# Patient Record
Sex: FEMALE | Race: WHITE | NOT HISPANIC OR LATINO
[De-identification: names, ages, dates, MRNs, and addresses within clinical notes are randomized per-mention and may not be internally consistent; named-entity substitution may affect disease eponyms.]

---

## 2017-01-11 ENCOUNTER — RX ONLY (OUTPATIENT)
Age: 62
Setting detail: RX ONLY
End: 2017-01-11

## 2017-01-11 RX ORDER — TRETINOIN 1 MG/G
CREAM TOPICAL
Qty: 1 | Refills: 0 | Status: ERX

## 2021-12-01 ENCOUNTER — APPOINTMENT (RX ONLY)
Dept: URBAN - METROPOLITAN AREA CLINIC 43 | Facility: CLINIC | Age: 66
Setting detail: DERMATOLOGY
End: 2021-12-01

## 2021-12-01 DIAGNOSIS — L98.8 OTHER SPECIFIED DISORDERS OF THE SKIN AND SUBCUTANEOUS TISSUE: ICD-10-CM

## 2021-12-01 DIAGNOSIS — Z41.9 ENCOUNTER FOR PROCEDURE FOR PURPOSES OTHER THAN REMEDYING HEALTH STATE, UNSPECIFIED: ICD-10-CM

## 2021-12-01 PROCEDURE — ? FILLERS

## 2021-12-01 PROCEDURE — ? PRESCRIPTION

## 2021-12-01 PROCEDURE — ? JUVEDERM VOLBELLA INJECTION

## 2021-12-01 RX ORDER — TRETIONIN 1 MG/G
CREAM TOPICAL
Qty: 20 | Refills: 6 | Status: ERX | COMMUNITY
Start: 2021-12-01

## 2021-12-01 RX ADMIN — TRETIONIN: 1 CREAM TOPICAL at 00:00

## 2021-12-01 ASSESSMENT — LOCATION SIMPLE DESCRIPTION DERM
LOCATION SIMPLE: RIGHT LIP
LOCATION SIMPLE: LEFT FOREHEAD

## 2021-12-01 ASSESSMENT — LOCATION DETAILED DESCRIPTION DERM
LOCATION DETAILED: RIGHT SUPERIOR VERMILION LIP
LOCATION DETAILED: LEFT INFERIOR MEDIAL FOREHEAD

## 2021-12-01 ASSESSMENT — LOCATION ZONE DERM
LOCATION ZONE: FACE
LOCATION ZONE: LIP

## 2021-12-01 NOTE — PROCEDURE: JUVEDERM VOLBELLA INJECTION
Vermilion Lips Filler Volume In Cc: 0
Anesthesia Type: 1% lidocaine with epinephrine
Consent: Written consent obtained. Risks include but not limited to bruising, beading, irregular texture, ulceration, infection, allergic reaction, scar formation, incomplete augmentation, temporary nature, procedural pain.
Additional Anesthesia Volume In Cc: 6
Lot #: [id number]
Procedural Text: The filler was administered to the treatment areas noted above.
Use Map Statement For Sites (Optional): No
Topical Anesthesia?: BLT cream (benzocaine 20%, lidocaine 6%, tetracaine 4%)
Number Of Syringes (Required For Inventory): 1
Post-Care Instructions: Patient instructed to apply ice to reduce swelling.
Map Statment: See Attach Map for Complete Details
Anesthesia Volume In Cc: 0.5
Detail Level: Detailed
Expiration Date (Month Year): 5/2023
Filler: Juvederm Volbella XC

## 2021-12-01 NOTE — HPI: COSMETIC (FILLERS)
Have You Had Fillers Before?: has had fillers
Additional History: Patient comes in for consult of filler for definition top lip and volume of lower lip. Possibly cheeks

## 2021-12-01 NOTE — PROCEDURE: FILLERS
Vermilion Lips Filler Volume In Cc: 0
Consent: Written consent obtained. Risks include but not limited to bruising, beading, irregular texture, ulceration, infection, allergic reaction, scar formation, incomplete augmentation, temporary nature, procedural pain.
Include Cannula Information In Note?: No
Filler: Juvederm Volbella XC
Post-Care Instructions: Patient instructed to apply ice to reduce swelling.
Detail Level: Detailed
Vermilion Lips Filler Volume In Cc: 1
Lot #: [id number]
Anesthesia Type: 1% lidocaine with epinephrine
Expiration Date (Month Year): 05/2023
Anesthesia Volume In Cc: 0.5
Map Statment: See Attach Map for Complete Details
Additional Anesthesia Volume In Cc: 6
Additional Area 1 Location: upper cutaneous lip

## 2022-05-05 ENCOUNTER — APPOINTMENT (RX ONLY)
Dept: URBAN - METROPOLITAN AREA CLINIC 43 | Facility: CLINIC | Age: 67
Setting detail: DERMATOLOGY
End: 2022-05-05

## 2022-05-05 DIAGNOSIS — L98.8 OTHER SPECIFIED DISORDERS OF THE SKIN AND SUBCUTANEOUS TISSUE: ICD-10-CM

## 2022-05-05 PROCEDURE — ? BOTOX

## 2022-05-05 PROCEDURE — ? FILLERS

## 2022-05-05 ASSESSMENT — LOCATION SIMPLE DESCRIPTION DERM
LOCATION SIMPLE: LEFT LIP
LOCATION SIMPLE: INFERIOR FOREHEAD

## 2022-05-05 ASSESSMENT — LOCATION DETAILED DESCRIPTION DERM
LOCATION DETAILED: LEFT SUPERIOR VERMILION LIP
LOCATION DETAILED: INFERIOR MID FOREHEAD

## 2022-05-05 ASSESSMENT — LOCATION ZONE DERM
LOCATION ZONE: FACE
LOCATION ZONE: LIP

## 2022-05-05 NOTE — PROCEDURE: BOTOX
Show Nasal Units: Yes
Dilution (U/0.1 Cc): 4
Left Periorbital Skin Units: 0
Show Lcl Units: No
Expiration Date (Month Year): 4/24
Glabellar Complex Units: 20
Consent: Written consent obtained. Risks include but not limited to lid/brow ptosis, bruising, swelling, diplopia, temporary effect, incomplete chemical denervation.
Post-Care Instructions: Patient instructed to not lie down for 4 hours and limit physical activity for 24 hours.
Detail Level: Detailed
Lot #: [id number]
Periorbital Skin Units: 24

## 2022-05-05 NOTE — PROCEDURE: FILLERS
Additional Area 2 Volume In Cc: 0
Use Map Statement For Sites (Optional): No
Additional Anesthesia Volume In Cc: 6
Filler: Juvederm Ultra XC
Post-Care Instructions: Patient instructed to apply ice to reduce swelling.
Map Statment: See Attach Map for Complete Details
Vermilion Lips Filler Volume In Cc: 1
Lot #: [id number]
Expiration Date (Month Year): 08/22
Detail Level: Detailed
Anesthesia Type: 1% lidocaine with epinephrine
Anesthesia Volume In Cc: 0.5
Consent: Written consent obtained. Risks include but not limited to bruising, beading, irregular texture, ulceration, infection, allergic reaction, scar formation, incomplete augmentation, temporary nature, procedural pain.

## 2022-05-05 NOTE — HPI: COSMETIC (BOTOX)
Have You Had Botox Before?: has had botox
Additional History: Comes in today for Botox and volbella lip filler.

## 2024-04-15 ENCOUNTER — APPOINTMENT (RX ONLY)
Dept: URBAN - METROPOLITAN AREA CLINIC 43 | Facility: CLINIC | Age: 69
Setting detail: DERMATOLOGY
End: 2024-04-15

## 2024-04-15 ENCOUNTER — RX ONLY (OUTPATIENT)
Age: 69
Setting detail: RX ONLY
End: 2024-04-15

## 2024-04-15 DIAGNOSIS — L98.8 OTHER SPECIFIED DISORDERS OF THE SKIN AND SUBCUTANEOUS TISSUE: ICD-10-CM

## 2024-04-15 PROCEDURE — ? PRESCRIPTION

## 2024-04-15 PROCEDURE — ? FILLERS

## 2024-04-15 PROCEDURE — ? SKIN MEDICINALS

## 2024-04-15 RX ORDER — BIMATOPROST 0.3 MG/ML
SOLUTION/ DROPS OPHTHALMIC
Qty: 3 | Refills: 0 | Status: ERX | COMMUNITY
Start: 2024-04-15

## 2024-04-15 RX ORDER — BIMATOPROST 0.3 MG/ML
SOLUTION/ DROPS OPHTHALMIC
Qty: 3 | Refills: 2 | Status: ERX | COMMUNITY
Start: 2024-04-15

## 2024-04-15 RX ADMIN — BIMATOPROST: 0.3 SOLUTION/ DROPS OPHTHALMIC at 00:00

## 2024-04-15 ASSESSMENT — LOCATION SIMPLE DESCRIPTION DERM
LOCATION SIMPLE: RIGHT FOREHEAD
LOCATION SIMPLE: LEFT SUPERIOR EYELID
LOCATION SIMPLE: LEFT LIP

## 2024-04-15 ASSESSMENT — LOCATION ZONE DERM
LOCATION ZONE: EYELID
LOCATION ZONE: FACE
LOCATION ZONE: LIP

## 2024-04-15 ASSESSMENT — LOCATION DETAILED DESCRIPTION DERM
LOCATION DETAILED: LEFT INFERIOR VERMILION LIP
LOCATION DETAILED: RIGHT FOREHEAD
LOCATION DETAILED: LEFT LATERAL SUPERIOR EYELID

## 2024-04-15 NOTE — PROCEDURE: [facility]
Sig: Apply a thin layer to the affected areas twice daily
Sig: Apply pea sized amount per area at night
Sig: Use as directed when washing hair
Sig: Apply to affected areas twice daily
Sig: Apply a thin layer to the affected nails daily
Sig: Apply to affected areas on face twice daily
Sig: Apply a thin layer to the itching areas twice daily as needed
Sig: Apply a thin layer to the areas with decreased hair density 1-2 times daily
Sig: Take one twice daily
Sig: Take one pill twice daily
Sig: Apply a thin layer to the affected skin twice daily
Detail Level: Detailed
Sig: Apply a thin layer to the affected areas daily
Sig: Apply nightly to warts nightly under occlusion
Sig: Wash affected areas daily.
Sig: Take one pill daily
Sig: Apply a thin layer to the scar daily
Product Type (1): Acne
Sig: Apply to the affected skin twice daily
Sig: Apply thin layer twice daily for 5-7 days to face for actinic keratosis
Acne Medicines Prescription 1: Tretinoin 0.1%, Niacinamide 2%, Hyaluronic Acid 0.25% Cream
Sig: Apply twice daily for 5 days
Intro Statement: I recommended the following products:

## 2024-04-15 NOTE — PROCEDURE: FILLERS
Dorsal Hands Filler Volume In Cc: 0
Include Cannula Information In Note?: No
Map Statment: See Attach Map for Complete Details
Lot #: [id number]
Expiration Date (Month Year): 07/24
Filler: Juvederm Vollure XC
Vermilion Lips Filler Volume In Cc: 1
Lot #: [id number]
Expiration Date (Month Year): 1/1/25
Anesthesia Type: 1% lidocaine with epinephrine
Aspiration Statement: Aspiration was performed prior to injecting site with filler.
Anesthesia Volume In Cc: 0.5
Additional Area 1 Location: upper lip lines
Lot #: [id number]
Additional Anesthesia Volume In Cc: 6
Additional Area 1 Location: upper cutaneous lip
Detail Level: Detailed
Expiration Date (Month Year): 11/16/24
Additional Area 2 Location: lower cutaneous lip
Consent: Written consent obtained. Risks include but not limited to bruising, beading, irregular texture, ulceration, infection, allergic reaction, scar formation, incomplete augmentation, temporary nature, procedural pain.
Post-Care Instructions: Patient instructed to apply ice to reduce swelling.
Filler: Juvederm Volbella XC

## 2024-04-30 RX ORDER — TRETIONIN 1 MG/G
CREAM TOPICAL
Qty: 20 | Refills: 2 | Status: ERX

## 2024-04-30 RX ORDER — TRETIONIN 1 MG/G
CREAM TOPICAL
Qty: 20 | Refills: 2 | Status: CANCELLED

## 2024-05-01 ENCOUNTER — RX ONLY (OUTPATIENT)
Age: 69
Setting detail: RX ONLY
End: 2024-05-01

## 2025-07-21 ENCOUNTER — APPOINTMENT (OUTPATIENT)
Dept: URBAN - METROPOLITAN AREA CLINIC 43 | Facility: CLINIC | Age: 70
Setting detail: DERMATOLOGY
End: 2025-07-21

## 2025-07-21 ENCOUNTER — RX ONLY (RX ONLY)
Age: 70
End: 2025-07-21

## 2025-07-21 DIAGNOSIS — L98.8 OTHER SPECIFIED DISORDERS OF THE SKIN AND SUBCUTANEOUS TISSUE: ICD-10-CM

## 2025-07-21 PROCEDURE — ? FILLERS

## 2025-07-21 PROCEDURE — ? BOTOX

## 2025-07-21 RX ORDER — BIMATOPROST 0.3 MG/ML
SOLUTION/ DROPS OPHTHALMIC
Qty: 3 | Refills: 2 | Status: ERX

## 2025-07-21 ASSESSMENT — LOCATION DETAILED DESCRIPTION DERM
LOCATION DETAILED: RIGHT UPPER CUTANEOUS LIP
LOCATION DETAILED: LEFT SUPERIOR VERMILION LIP

## 2025-07-21 ASSESSMENT — LOCATION ZONE DERM: LOCATION ZONE: LIP

## 2025-07-21 ASSESSMENT — LOCATION SIMPLE DESCRIPTION DERM
LOCATION SIMPLE: LEFT LIP
LOCATION SIMPLE: RIGHT LIP

## 2025-07-21 NOTE — PROCEDURE: BOTOX
Additional Area 2 Units: 0
Show Anterior Platysmal Band Units: Yes
Lot #: [id number]
Expiration Date (Month Year): 6/27
Post-Care Instructions: Patient instructed to not lie down for 4 hours and limit physical activity for 24 hours.
Show Right And Left Pupillary Line Units: No
Additional Area 1 Location: upper lip
Additional Area 6 Location: left lateral eyebrow
Additional Area 2 Location: lower lip
Additional Area 5 Location: right lateral eybrow
Incrementing Botox Units: By 0.5 Units
Dilution (U/0.1 Cc): 10
Detail Level: Detailed
Additional Area 4 Location: bunny lines
Additional Area 1 Units: 4
Additional Area 3 Location: lower eyelids
Consent: Written consent obtained. Risks include but not limited to lid/brow ptosis, bruising, swelling, diplopia, temporary effect, incomplete chemical denervation.

## 2025-07-21 NOTE — PROCEDURE: FILLERS
Tear Troughs Filler Volume In Cc: 0
Additional Area 1 Location: oral commissures
Lot #: [id number]
Additional Area 2 Location: lower cutaneous lip
Expiration Date (Month Year): 6/26
Filler: Juvederm Volbella XC
Map Statment: See Attach Map for Complete Details
Include Cannula Information In Note?: No
Additional Area 1 Location: upper cutaneous lip
Consent: Written consent obtained. Risks include but not limited to bruising, beading, irregular texture, ulceration, infection, allergic reaction, scar formation, incomplete augmentation, temporary nature, procedural pain.
Aspiration Statement: Aspiration was performed prior to injecting site with filler.
Vermilion Lips Filler Volume In Cc: 1
Post-Care Instructions: Patient instructed to apply ice to reduce swelling.
Anesthesia Type: 1% lidocaine with epinephrine
Lot #: [id number]
Detail Level: Detailed
Anesthesia Volume In Cc: 0.5
Expiration Date (Month Year): 3/26
Lot #: [id number]
Additional Area 1 Location: upper lip lines
Expiration Date (Month Year): 07/24
Additional Anesthesia Volume In Cc: 6
Topical Anesthesia?: 23% lidocaine, 7% tetracaine
vital signs

## 2025-08-12 ENCOUNTER — RX ONLY (RX ONLY)
Age: 70
End: 2025-08-12

## 2025-08-12 RX ORDER — TRETIONIN 1 MG/G
CREAM TOPICAL
Qty: 45 | Refills: 6 | Status: ERX